# Patient Record
Sex: FEMALE | Race: WHITE | NOT HISPANIC OR LATINO | Employment: UNEMPLOYED | URBAN - METROPOLITAN AREA
[De-identification: names, ages, dates, MRNs, and addresses within clinical notes are randomized per-mention and may not be internally consistent; named-entity substitution may affect disease eponyms.]

---

## 2022-01-11 ENCOUNTER — OFFICE VISIT (OUTPATIENT)
Dept: INTERNAL MEDICINE | Facility: CLINIC | Age: 47
End: 2022-01-11

## 2022-01-11 VITALS
OXYGEN SATURATION: 99 % | BODY MASS INDEX: 27.13 KG/M2 | DIASTOLIC BLOOD PRESSURE: 74 MMHG | SYSTOLIC BLOOD PRESSURE: 116 MMHG | TEMPERATURE: 98.9 F | RESPIRATION RATE: 16 BRPM | WEIGHT: 158.9 LBS | HEART RATE: 85 BPM | HEIGHT: 64 IN

## 2022-01-11 DIAGNOSIS — N90.810 FEMALE CIRCUMCISION: Primary | ICD-10-CM

## 2022-01-11 PROCEDURE — 99202 OFFICE O/P NEW SF 15 MIN: CPT | Performed by: INTERNAL MEDICINE

## 2022-01-11 ASSESSMENT — MIFFLIN-ST. JEOR: SCORE: 1350.77

## 2022-01-11 NOTE — PROGRESS NOTES
"  Assessment & Plan     Female circumcision  Provided her with a letter indicating she is undergone female circumcision. She is planning to follow-up with OB/GYN.        No follow-ups on file.    Alona Herrmann MD  [facility]MARYANNE King is a 46 year old who presents for the following health issues     HPI     Establish care. Needs letter stating she has had female surgical circumcision. This was done to her in [place], it has caused a lot of significant discomfort, some psychological effects. She is applying for asylum so needs this letter for that purpose. Would also like to follow-up with a gynecologist for possible procedure to help decrease the pain associated with the surgery.      Review of Systems   negative      Objective    /74 (BP Location: Right arm, Patient Position: Chair, Cuff Size: Adult Large)   Pulse 85   Temp 98.9  F (37.2  C) (Oral)   Resp 16   Ht 1.626 m (5' 4\")   Wt 72.1 kg (158 lb 14.4 oz)   SpO2 99%   BMI 27.28 kg/m    Body mass index is 27.28 kg/m .  Physical Exam     External genitalia reveals evidence of surgical circumcision, vaginal opening is extremely small, this was not examined with a speculum.              "

## 2022-01-20 ENCOUNTER — TELEPHONE (OUTPATIENT)
Dept: INTERNAL MEDICINE | Facility: CLINIC | Age: 47
End: 2022-01-20

## 2022-01-20 DIAGNOSIS — R45.86 MOOD DISTURBANCE: Primary | ICD-10-CM

## 2022-01-20 PROBLEM — N90.810 FEMALE CIRCUMCISION: Status: ACTIVE | Noted: 2022-01-20

## 2022-01-20 NOTE — TELEPHONE ENCOUNTER
Please call the patient and advise that she can see any one of our gynecology specialists for the female circumcision issues.  She does not need a specific referral for this.

## 2022-01-20 NOTE — TELEPHONE ENCOUNTER
She did not bring up any issue of mood problems at her visit.  I was only going to check on a gynecologist.   If she is interested in some counseling, we can refer her to behavioral health.  She is having significant issues that may need medication, she will probably need to make another visit.

## 2022-01-20 NOTE — TELEPHONE ENCOUNTER
Patient advised and agrees to plan. Patient states you were going to check on a provider to send her to for depression?

## 2023-08-14 NOTE — LETTER
[place] [address]  [facility] [address]  [phone]    1/11/2022     Re: Fernando DECLAN Padilla   1975     To Whom it May Concern:    I have examined Ms. Padilla and confirm that she has had  female circumcision surgery with significant deformity. This has had a significant negative impact on her sense of well being.         Sincerely,             Alona Herrmann MD    When Was It Treated?: 7/26/2023

## 2023-08-27 ENCOUNTER — NURSE TRIAGE (OUTPATIENT)
Dept: NURSING | Facility: CLINIC | Age: 48
End: 2023-08-27

## 2023-08-27 NOTE — TELEPHONE ENCOUNTER
Nurse Triage SBAR    Is this a 2nd Level Triage? NO    Situation: Difficulty breathing    Background: Sick the last 3 days, coughing, sneezing, fever, woke at 2:30 with difficulty breathing.     Assessment: Coughing up green sputum. Having chest pain. Struggling to speak.     Protocol Recommended Disposition:   Call  Now    Recommendation: Call 911. Patient reports her breathing is so difficult she does not feel she can; she is worried she will have to answer all of these questions again. Called 911 for patient. Patient is not at her home address listed in the chart, she is at a new address [address] [place] [address], [address]. Provided this address to the 911 dispatch.      Larisa Duffy RN on 8/27/2023 at 6:01 AM      Reason for Disposition   SEVERE difficulty breathing (e.g., struggling for each breath, speaks in single words)    Protocols used: Breathing Difficulty-A-AH

## 2023-12-14 ENCOUNTER — HOSPITAL ENCOUNTER (EMERGENCY)
Facility: CLINIC | Age: 48
Discharge: HOME | End: 2023-12-14
Attending: EMERGENCY MEDICINE | Admitting: EMERGENCY MEDICINE
Payer: COMMERCIAL

## 2023-12-14 VITALS
RESPIRATION RATE: 16 BRPM | HEART RATE: 64 BPM | SYSTOLIC BLOOD PRESSURE: 124 MMHG | DIASTOLIC BLOOD PRESSURE: 69 MMHG | TEMPERATURE: 97.4 F | OXYGEN SATURATION: 100 %

## 2023-12-14 DIAGNOSIS — R22.0 FACIAL SWELLING: ICD-10-CM

## 2023-12-14 PROCEDURE — 99284 EMERGENCY DEPT VISIT MOD MDM: CPT

## 2023-12-14 PROCEDURE — 250N000013 HC RX MED GY IP 250 OP 250 PS 637: Performed by: EMERGENCY MEDICINE

## 2023-12-14 PROCEDURE — 250N000012 HC RX MED GY IP 250 OP 636 PS 637: Performed by: EMERGENCY MEDICINE

## 2023-12-14 RX ORDER — FAMOTIDINE 10 MG
10 TABLET ORAL 2 TIMES DAILY
Qty: 1 TABLET | Refills: 0 | Status: COMPLETED | OUTPATIENT
Start: 2023-12-14 | End: 2023-12-14

## 2023-12-14 RX ORDER — PREDNISONE 20 MG/1
40 TABLET ORAL DAILY
Qty: 8 TABLET | Refills: 0 | Status: SHIPPED | OUTPATIENT
Start: 2023-12-14 | End: 2023-12-18

## 2023-12-14 RX ORDER — DIPHENHYDRAMINE HCL 25 MG
25-50 TABLET ORAL EVERY 6 HOURS PRN
Qty: 12 TABLET | Refills: 0 | Status: SHIPPED | OUTPATIENT
Start: 2023-12-14

## 2023-12-14 RX ORDER — BENZOCAINE/MENTHOL 6 MG-10 MG
LOZENGE MUCOUS MEMBRANE 2 TIMES DAILY
Qty: 1 G | Refills: 0 | Status: SHIPPED | OUTPATIENT
Start: 2023-12-14 | End: 2023-12-19

## 2023-12-14 RX ORDER — DIPHENHYDRAMINE HCL 25 MG
25 CAPSULE ORAL ONCE
Status: COMPLETED | OUTPATIENT
Start: 2023-12-14 | End: 2023-12-14

## 2023-12-14 RX ORDER — PREDNISONE 20 MG/1
40 TABLET ORAL ONCE
Status: COMPLETED | OUTPATIENT
Start: 2023-12-14 | End: 2023-12-14

## 2023-12-14 RX ORDER — FAMOTIDINE 20 MG/1
20 TABLET, FILM COATED ORAL 2 TIMES DAILY
Qty: 20 TABLET | Refills: 0 | Status: SHIPPED | OUTPATIENT
Start: 2023-12-14 | End: 2023-12-24

## 2023-12-14 RX ORDER — EPINEPHRINE 0.3 MG/.3ML
0.3 INJECTION SUBCUTANEOUS
Qty: 0.3 ML | Refills: 0 | Status: SHIPPED | OUTPATIENT
Start: 2023-12-14

## 2023-12-14 RX ADMIN — FAMOTIDINE 10 MG: 10 TABLET, FILM COATED ORAL at 23:29

## 2023-12-14 RX ADMIN — PREDNISONE 40 MG: 20 TABLET ORAL at 23:30

## 2023-12-14 RX ADMIN — DIPHENHYDRAMINE HYDROCHLORIDE 25 MG: 25 CAPSULE ORAL at 23:29

## 2023-12-14 ASSESSMENT — ACTIVITIES OF DAILY LIVING (ADL): ADLS_ACUITY_SCORE: 33

## 2023-12-14 NOTE — Clinical Note
Fernando Padilla was seen and treated in our emergency department on 12/14/2023.  She may return to work on 12/18/2023.       If you have any questions or concerns, please don't hesitate to call.      Mandy Hernandez, DO

## 2023-12-14 NOTE — Clinical Note
Fernando Padilla was seen and treated in our emergency department on 12/14/2023.  She may return to work on 12/16/2023.       If you have any questions or concerns, please don't hesitate to call.      Mandy Hernandez, DO

## 2023-12-15 NOTE — DISCHARGE INSTRUCTIONS
CONCERN FOR POSSIBLE ALLERGIC REACTION. TAKE MEDICATIONS PRESCRIBED. PLEASE [facility]AL DERMATOLOGY [facility]. IF YOU DEVELOP INCREASED SWELLING ,DIFFICULTY BREATHING, PLEASE REPRESENT.    Discharge Instructions  Allergic Reaction    An allergic reaction can result in a rash, itching, swelling, watery eyes, or a runny nose. A serious reaction can cause swelling of your mouth or throat, or difficulty breathing (wheezing). The most serious allergy is called anaphylaxis, and can be life-threatening. Many allergies result in hives, also called urticaria.       An allergy happens when the body s natural defense system (immune system) overreacts to something. The thing that triggers your allergic reaction is called an allergen. The first time you are exposed to your allergen, you may not have any reaction, but the body makes a protein called an antibody. The antibody lets the body recognize and remember the allergen.  Every time you are exposed to your allergen you get more antibody and your reaction can be more severe.      Generally, every Emergency Department visit should have a follow-up clinic visit with either a primary or a specialty clinic/provider. Please follow-up as instructed by your emergency provider today.    Call 911 if you have:  Swelling of the lips, tongue or throat.  Hoarse voice, drooling or trouble breathing.  Chest pain or shortness of breath.  Fainting or unconsciousness.    What can I do to help myself?  If you know what caused your allergy, do not touch it, throw any of it away, and tell others not to have it around you. Wear a medical alert bracelet with a name of your allergen on it.  If you do not know what you are allergic to, keep a journal of everything that you are exposed to (foods, soaps, medicines, etc.). Take this with you when you follow up with your primary provider or specialist (Allergist). This may help determine what is causing the allergic reaction.  Take any medicines that are  prescribed.  Antihistamines can decrease rash or itching. You may use Benadryl  (diphenhydramine) for rash or itching according to package directions, or use a prescription antihistamine as recommended by your provider.  For significant allergic reactions, you may have been given a prescription for an epinephrine (adrenaline) auto injector. Carry this with you at all times! Use it if you are having any symptoms of anaphylaxis.  Do not be afraid to use it. Return to the Emergency Department if you use your auto injector, call 911 if it does not resolve the symptoms. It is only meant to buy time until you can get to the Emergency Department!  If you were given a prescription for medicine here today, be sure to read all of the information (including the package insert) that comes with your prescription.  This will include important information about the medicine, its side effects, and any warnings that you need to know about.  The pharmacist who fills the prescription can provide more information and answer questions you may have about the medicine.  If you have questions or concerns that the pharmacist cannot address, please call or return to the Emergency Department.   Remember that you can always come back to the Emergency Department if you are not able to see your regular provider in the amount of time listed above, if you get any new symptoms, or if there is anything that worries you.

## 2023-12-15 NOTE — ED TRIAGE NOTES
Hx of some facial swelling. Was given 2 different topical creams for swelling - hasn't used med today. Under eye swelling bilaterally. Pt reports swelling is itchy. No pain. No fever.

## 2023-12-15 NOTE — ED PROVIDER NOTES
History     Chief Complaint:  Facial Swelling     The history is provided by the patient.      Fernando Padilla is a 48 year old female who presents with bilateral eye swelling with redness which started 5 hours ago after work. She notes she has associated itching to her eyes, ear, and neck which started this morning. She notes she was seen for the same symptoms 15 days ago and given hydrocortisone and then switched to tacrolimus. She notes she was given pills as well which she stopped because they made her stomach upset. She denies fever, cough, sore throat, difficulty swallowing, chest pain, nausea, vomiting, or rashes. She denies any new soaps, medications, foods, or getting anything in her eyes. She denies taking any Benadryl.     Independent Historian:   None - Patient Only    Review of External Notes:   I reviewed the patient's nurse triage summary from 08/27/2023.      Medications:    The patient is not currently taking any prescribed medications.    Past Medical History:    Female circumcision   Complete miscarriage     Physical Exam   Patient Vitals for the past 24 hrs:   BP Temp Temp src Pulse Resp SpO2   12/14/23 2216 124/69 97.4  F (36.3  C) Temporal 64 16 100 %      Physical Exam  Nursing note and vitals reviewed.  Constitutional: Well nourished.  Eyes: Conjunctiva normal.  Pupils are equal, round, and reactive to light. Mild soft tissue swelling just inferior to eyes bilaterally; nontender, no surrounding warmth/erythema  ENT: Nose normal. Mucous membranes pink and moist.  TM normal. No posterior oropharyngeal erythema/exudate. Uvula midline. No tongue swelling. No cheek swelling  Neck: Normal range of motion.  CVS: Normal rate, regular rhythm.  Normal heart sounds.   Pulmonary: Lungs clear to auscultation bilaterally. No wheezes/rales/rhonchi.  GI: Abdomen soft. Nontender, nondistended. No rigidity or guarding.    MSK: Moves all extremities  Neuro: Alert. Follows simple commands.  Skin: Skin is warm  and dry. No rash noted.   Psychiatric: Normal affect.       Emergency Department Course     Emergency Department Course & Assessments:    Interventions:  Medications   diphenhydrAMINE (BENADRYL) capsule 25 mg (25 mg Oral $Given 12/14/23 2329)   famotidine (PEPCID) tablet 10 mg (10 mg Oral $Given 12/14/23 2329)   predniSONE (DELTASONE) tablet 40 mg (40 mg Oral $Given 12/14/23 2330)      Independent Interpretation (X-rays, CTs, rhythm strip):  None    Assessments/Consultations/Discussion of Management or Tests:   ED Course as of 12/14/23 2341   u Dec 14, 2023   2312 I obtained the patient's history and examined as noted above.      Social Determinants of Health affecting care:   None    Disposition:  The patient was discharged to home.     Impression & Plan      Medical Decision Making:  Patient is a 48-year-old female presenting with primary complaints of eye itchiness as well as swelling just inferior to her eyes.  She is nontoxic, in no significant distress.  I did discuss with patient stronger suspicion for more allergic phenomenon at this point in time.  No clinical evidence to suggest infectious etiology.  She denies any known inciting agents.  She has been on outpatient tacrolimus and hydrocortisone though stopped both of these very quickly.  At this point in time I do not think it is unreasonable to manage patient with prednisone, Benadryl and Pepcid.  No evidence to suggest anaphylaxis though I will provide an EpiPen and instructions on utilization and indications were reviewed with the patient.  I will also plan to prescribe hydrocortisone cream as I did discuss with patient a short course of this is safe to use on the face though prolonged use is not recommended.  Most importantly however I will provide dermatology referral as I do think that this will be of greatest benefit to the patient.  We also reviewed consideration of allergy testing in the outpatient setting should this persist.  Patient  comfortable with plan of care, all questions addressed and return precautions reviewed.    Diagnosis:    ICD-10-CM    1. Facial swelling  R22.0     concern for allergic response         Discharge Medications:  New Prescriptions    DIPHENHYDRAMINE (BENADRYL) 25 MG TABLET    Take 1-2 tablets (25-50 mg) by mouth every 6 hours as needed for itching or allergies    EPINEPHRINE (ANY BX GENERIC EQUIV) 0.3 MG/0.3ML INJECTION 2-PACK    Inject 0.3 mLs (0.3 mg) into the muscle once as needed for anaphylaxis May repeat one time in 5-15 minutes if response to initial dose is inadequate.    FAMOTIDINE (PEPCID) 20 MG TABLET    Take 1 tablet (20 mg) by mouth 2 times daily for 10 days    HYDROCORTISONE (CORTAID) 1 % EXTERNAL CREAM    Apply topically 2 times daily for 5 days    PREDNISONE (DELTASONE) 20 MG TABLET    Take 2 tablets (40 mg) by mouth daily for 4 days      Scribe Disclosure:  I, Leydi Gillette, am serving as a scribe at 10:06 PM on 10/22/2023 to document services personally performed by Mandy Hernandez DO based on my observations and the provider's statements to me.      10/22/2023   Mandy Hernandez DO McDonald, Lindsey E, DO  12/14/23 3079

## 2024-01-11 ENCOUNTER — APPOINTMENT (OUTPATIENT)
Dept: URBAN - METROPOLITAN AREA CLINIC 253 | Age: 49
Setting detail: DERMATOLOGY
End: 2024-01-16

## 2024-01-11 VITALS — WEIGHT: 152 LBS | HEIGHT: 64 IN | RESPIRATION RATE: 14 BRPM

## 2024-01-11 DIAGNOSIS — L23.9 ALLERGIC CONTACT DERMATITIS, UNSPECIFIED CAUSE: ICD-10-CM

## 2024-01-11 PROCEDURE — OTHER PRESCRIPTION: OTHER

## 2024-01-11 PROCEDURE — OTHER COUNSELING: OTHER

## 2024-01-11 PROCEDURE — OTHER MIPS QUALITY: OTHER

## 2024-01-11 PROCEDURE — OTHER ADDITIONAL NOTES: OTHER

## 2024-01-11 PROCEDURE — 99203 OFFICE O/P NEW LOW 30 MIN: CPT

## 2024-01-11 RX ORDER — DESONIDE 0.5 MG/G
CREAM TOPICAL
Qty: 15 | Refills: 1 | Status: ERX | COMMUNITY
Start: 2024-01-11

## 2024-01-11 ASSESSMENT — LOCATION DETAILED DESCRIPTION DERM
LOCATION DETAILED: RIGHT CENTRAL MALAR CHEEK
LOCATION DETAILED: LEFT MEDIAL MALAR CHEEK

## 2024-01-11 ASSESSMENT — LOCATION ZONE DERM: LOCATION ZONE: FACE

## 2024-01-11 ASSESSMENT — LOCATION SIMPLE DESCRIPTION DERM
LOCATION SIMPLE: RIGHT CHEEK
LOCATION SIMPLE: LEFT CHEEK

## 2024-01-11 NOTE — HPI: RASH
Is This A New Presentation, Or A Follow-Up?: Rash
Additional History: She states the rash has been present for one month. She went to her doctor twice and the ER once. The second doctor said to do a blood test to find out about allergies. She was previously given hydrocortisone cream, tacrolimus ointment, famotidine, hydroxyzine, prednisone, banophen. She states they temporarily helped. She states the rash was previously on her ears and neck. She describes it as hot, swollen, itchy, painful, and inflamed. Her face feels like it is burning when she applies moisturizers or creams.

## 2024-01-11 NOTE — PROCEDURE: ADDITIONAL NOTES
Render Risk Assessment In Note?: no
Additional Notes: Patient states that she had done a dermaroller and followed the treatment with a serum infused with floral extract.\\nDiscontinue the hydrocortisone cream with aloe, the tacrolimus ointment, and the other hydrocortisone cream. \\nAdvised patient to use gentle moisturizers and cleansers and to avoid makeup products for the time being. \\nPatient given samples of Vanicream moisturizers and Cetaphil gentle cleanser. She was also given a sample of the Opzelura cream.
Detail Level: Simple

## 2024-01-25 ENCOUNTER — APPOINTMENT (OUTPATIENT)
Dept: URBAN - METROPOLITAN AREA CLINIC 253 | Age: 49
Setting detail: DERMATOLOGY
End: 2024-01-25

## 2024-01-25 VITALS — WEIGHT: 162 LBS | HEIGHT: 64 IN | RESPIRATION RATE: 14 BRPM

## 2024-01-25 DIAGNOSIS — L90.8 OTHER ATROPHIC DISORDERS OF SKIN: ICD-10-CM

## 2024-01-25 DIAGNOSIS — L23.9 ALLERGIC CONTACT DERMATITIS, UNSPECIFIED CAUSE: ICD-10-CM

## 2024-01-25 PROCEDURE — OTHER COUNSELING: OTHER

## 2024-01-25 PROCEDURE — 99213 OFFICE O/P EST LOW 20 MIN: CPT

## 2024-01-25 PROCEDURE — OTHER ADDITIONAL NOTES: OTHER

## 2024-01-25 PROCEDURE — OTHER MIPS QUALITY: OTHER

## 2024-01-25 ASSESSMENT — LOCATION DETAILED DESCRIPTION DERM
LOCATION DETAILED: LEFT MEDIAL MALAR CHEEK
LOCATION DETAILED: LEFT INFERIOR CENTRAL MALAR CHEEK
LOCATION DETAILED: RIGHT CENTRAL MALAR CHEEK

## 2024-01-25 ASSESSMENT — LOCATION ZONE DERM: LOCATION ZONE: FACE

## 2024-01-25 ASSESSMENT — LOCATION SIMPLE DESCRIPTION DERM
LOCATION SIMPLE: RIGHT CHEEK
LOCATION SIMPLE: LEFT CHEEK